# Patient Record
Sex: FEMALE | Race: WHITE | NOT HISPANIC OR LATINO | URBAN - METROPOLITAN AREA
[De-identification: names, ages, dates, MRNs, and addresses within clinical notes are randomized per-mention and may not be internally consistent; named-entity substitution may affect disease eponyms.]

---

## 2017-05-22 ENCOUNTER — APPOINTMENT (RX ONLY)
Dept: URBAN - METROPOLITAN AREA CLINIC 348 | Facility: CLINIC | Age: 77
Setting detail: DERMATOLOGY
End: 2017-05-22

## 2017-05-22 DIAGNOSIS — L82.0 INFLAMED SEBORRHEIC KERATOSIS: ICD-10-CM

## 2017-05-22 DIAGNOSIS — D18.0 HEMANGIOMA: ICD-10-CM

## 2017-05-22 DIAGNOSIS — L82.1 OTHER SEBORRHEIC KERATOSIS: ICD-10-CM

## 2017-05-22 DIAGNOSIS — L81.4 OTHER MELANIN HYPERPIGMENTATION: ICD-10-CM

## 2017-05-22 PROBLEM — E78.5 HYPERLIPIDEMIA, UNSPECIFIED: Status: ACTIVE | Noted: 2017-05-22

## 2017-05-22 PROBLEM — D18.01 HEMANGIOMA OF SKIN AND SUBCUTANEOUS TISSUE: Status: ACTIVE | Noted: 2017-05-22

## 2017-05-22 PROBLEM — I10 ESSENTIAL (PRIMARY) HYPERTENSION: Status: ACTIVE | Noted: 2017-05-22

## 2017-05-22 PROCEDURE — ? LIQUID NITROGEN

## 2017-05-22 PROCEDURE — ? COUNSELING

## 2017-05-22 PROCEDURE — 99203 OFFICE O/P NEW LOW 30 MIN: CPT | Mod: 25

## 2017-05-22 PROCEDURE — 17110 DESTRUCTION B9 LES UP TO 14: CPT

## 2017-05-22 ASSESSMENT — LOCATION SIMPLE DESCRIPTION DERM
LOCATION SIMPLE: LEFT SHOULDER
LOCATION SIMPLE: RIGHT BREAST
LOCATION SIMPLE: RIGHT UPPER BACK

## 2017-05-22 ASSESSMENT — LOCATION ZONE DERM
LOCATION ZONE: TRUNK
LOCATION ZONE: ARM

## 2017-05-22 ASSESSMENT — LOCATION DETAILED DESCRIPTION DERM
LOCATION DETAILED: RIGHT MID-UPPER BACK
LOCATION DETAILED: LEFT POSTERIOR SHOULDER
LOCATION DETAILED: RIGHT INFRAMAMMARY CREASE (INNER QUADRANT)

## 2017-05-22 NOTE — PROCEDURE: LIQUID NITROGEN
Medical Necessity Information: It is in your best interest to select a reason for this procedure from the list below. All of these items fulfill various CMS LCD requirements except the new and changing color options.
Consent: The patient's consent was obtained including but not limited to risks of crusting, scabbing, blistering, scarring, darker or lighter pigmentary change, recurrence, incomplete removal and infection.
Add 52 Modifier (Optional): no
Medical Necessity Clause: This procedure was medically necessary because the lesions that were treated were:
Number Of Freeze-Thaw Cycles: 1 freeze-thaw cycle
Post-Care Instructions: I reviewed with the patient in detail post-care instructions. Patient is to wear sunprotection, and avoid picking at any of the treated lesions. Pt may apply Aquaphor to crusted or scabbing areas.
Detail Level: Detailed
Render Post-Care Instructions In Note?: yes

## 2017-10-02 ENCOUNTER — IMPORTED ENCOUNTER (OUTPATIENT)
Dept: URBAN - METROPOLITAN AREA CLINIC 59 | Facility: CLINIC | Age: 77
End: 2017-10-02

## 2017-10-02 PROBLEM — H35.372 PUCKERING OF MACULA, LEFT EYE: Noted: 2017-10-02

## 2017-10-02 PROBLEM — H26.493 OTHER SECONDARY CATARACT, BILATERAL: Noted: 2017-10-02

## 2017-10-02 PROBLEM — H04.123 TEAR FILM INSUFFICIENCY OF BILATERAL LACRIMAL GLANDS: Noted: 2017-10-02

## 2017-10-02 PROCEDURE — 92014 COMPRE OPH EXAM EST PT 1/>: CPT

## 2017-10-02 PROCEDURE — 92134 CPTRZ OPH DX IMG PST SGM RTA: CPT

## 2018-10-04 ENCOUNTER — IMPORTED ENCOUNTER (OUTPATIENT)
Dept: URBAN - METROPOLITAN AREA CLINIC 59 | Facility: CLINIC | Age: 78
End: 2018-10-04

## 2018-10-04 PROBLEM — H26.493 OTHER SECONDARY CATARACT, BILATERAL: Noted: 2018-10-04

## 2018-10-04 PROBLEM — H35.372 PUCKERING OF MACULA, LEFT EYE: Noted: 2018-10-04

## 2018-10-04 PROBLEM — H04.123 TEAR FILM INSUFFICIENCY OF BILATERAL LACRIMAL GLANDS: Noted: 2018-10-04

## 2018-10-04 PROCEDURE — 92014 COMPRE OPH EXAM EST PT 1/>: CPT

## 2018-10-04 PROCEDURE — 92134 CPTRZ OPH DX IMG PST SGM RTA: CPT

## 2018-10-11 ENCOUNTER — IMPORTED ENCOUNTER (OUTPATIENT)
Dept: URBAN - METROPOLITAN AREA CLINIC 59 | Facility: CLINIC | Age: 78
End: 2018-10-11

## 2018-10-11 PROBLEM — H04.123 TEAR FILM INSUFFICIENCY OF BILATERAL LACRIMAL GLANDS: Noted: 2018-10-11

## 2018-10-11 PROBLEM — H26.493 OTHER SECONDARY CATARACT, BILATERAL: Noted: 2018-10-11

## 2018-10-11 PROBLEM — H35.372 PUCKERING OF MACULA, LEFT EYE: Noted: 2018-10-11

## 2018-10-11 PROBLEM — H00.011 STYE OF RIGHT UPPER EYELID: Noted: 2018-10-11

## 2018-10-11 PROCEDURE — 92012 INTRM OPH EXAM EST PATIENT: CPT

## 2019-10-07 ENCOUNTER — IMPORTED ENCOUNTER (OUTPATIENT)
Dept: URBAN - METROPOLITAN AREA CLINIC 59 | Facility: CLINIC | Age: 79
End: 2019-10-07

## 2019-10-07 PROBLEM — H35.372 PUCKERING OF MACULA, LEFT EYE: Noted: 2019-10-07

## 2019-10-07 PROBLEM — H00.014 STYE OF LT UPPER EYELID: Noted: 2019-10-07

## 2019-10-07 PROBLEM — H26.493 OTHER SECONDARY CATARACT, BILATERAL: Noted: 2019-10-07

## 2019-10-07 PROBLEM — H04.123 TEAR FILM INSUFFICIENCY OF BILATERAL LACRIMAL GLANDS: Noted: 2019-10-07

## 2019-10-07 PROCEDURE — 92014 COMPRE OPH EXAM EST PT 1/>: CPT

## 2019-10-07 PROCEDURE — 92134 CPTRZ OPH DX IMG PST SGM RTA: CPT

## 2020-10-08 ENCOUNTER — IMPORTED ENCOUNTER (OUTPATIENT)
Dept: URBAN - METROPOLITAN AREA CLINIC 59 | Facility: CLINIC | Age: 80
End: 2020-10-08

## 2020-10-08 PROBLEM — H26.493 OTHER SECONDARY CATARACT, BILATERAL: Noted: 2020-10-08

## 2020-10-08 PROBLEM — H35.372 PUCKERING OF MACULA, LEFT EYE: Noted: 2020-10-08

## 2020-10-08 PROBLEM — H43.813 VITREOUS DEGENERATION, BILATERAL: Noted: 2020-10-08

## 2020-10-08 PROBLEM — H04.123 TEAR FILM INSUFFICIENCY OF BILATERAL LACRIMAL GLANDS: Noted: 2020-10-08

## 2020-10-08 PROCEDURE — 92134 CPTRZ OPH DX IMG PST SGM RTA: CPT

## 2020-10-08 PROCEDURE — 92015 DETERMINE REFRACTIVE STATE: CPT

## 2020-10-08 PROCEDURE — 92014 COMPRE OPH EXAM EST PT 1/>: CPT

## 2021-10-07 ENCOUNTER — IMPORTED ENCOUNTER (OUTPATIENT)
Dept: URBAN - METROPOLITAN AREA CLINIC 59 | Facility: CLINIC | Age: 81
End: 2021-10-07

## 2021-10-07 PROBLEM — H43.813 VITREOUS DEGENERATION, BILATERAL: Noted: 2021-10-07

## 2021-10-07 PROBLEM — H26.493 OTHER SECONDARY CATARACT, BILATERAL: Noted: 2021-10-07

## 2021-10-07 PROBLEM — H35.372 PUCKERING OF MACULA, LEFT EYE: Noted: 2021-10-07

## 2021-10-07 PROBLEM — H04.123 TEAR FILM INSUFFICIENCY OF BILATERAL LACRIMAL GLANDS: Noted: 2021-10-07

## 2021-10-07 PROCEDURE — 92134 CPTRZ OPH DX IMG PST SGM RTA: CPT

## 2021-10-07 PROCEDURE — 92014 COMPRE OPH EXAM EST PT 1/>: CPT

## 2023-10-15 ASSESSMENT — VISUAL ACUITY
OS_CC: 20/25
OU_CC: J1
OS_CC: 20/20-1
OD_CC: 20/20
OD_CC: 20/25
OD_CC: 20/20
OD_CC: 20/20-1
OD_CC: 20/25
OS_CC: 20/20
OS_CC: 20/20-1
OS_CC: 20/25+1
OD_CC: 20/25+1
OS_CC: 20/20

## 2023-10-15 ASSESSMENT — TONOMETRY
OD_IOP_MMHG: 12
OD_IOP_MMHG: 12
OS_IOP_MMHG: 11
OD_IOP_MMHG: 12
OS_IOP_MMHG: 11
OS_IOP_MMHG: 12
OD_IOP_MMHG: 10
OS_IOP_MMHG: 11

## 2023-10-15 ASSESSMENT — PACHYMETRY
OS_CT_UM: C:  FINAL: 0.000; P:
OD_CT_UM: C:  FINAL: 0.000; P: